# Patient Record
Sex: MALE | Race: BLACK OR AFRICAN AMERICAN | NOT HISPANIC OR LATINO | Employment: FULL TIME | URBAN - METROPOLITAN AREA
[De-identification: names, ages, dates, MRNs, and addresses within clinical notes are randomized per-mention and may not be internally consistent; named-entity substitution may affect disease eponyms.]

---
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
## 2019-01-29 ENCOUNTER — HOSPITAL ENCOUNTER (EMERGENCY)
Facility: HOSPITAL | Age: 19
Discharge: HOME | End: 2019-01-29
Attending: FAMILY MEDICINE

## 2019-01-29 VITALS
SYSTOLIC BLOOD PRESSURE: 124 MMHG | DIASTOLIC BLOOD PRESSURE: 56 MMHG | BODY MASS INDEX: 26.18 KG/M2 | HEIGHT: 71 IN | RESPIRATION RATE: 16 BRPM | TEMPERATURE: 99 F | OXYGEN SATURATION: 98 % | HEART RATE: 63 BPM | WEIGHT: 187 LBS

## 2019-01-29 DIAGNOSIS — S39.012A LUMBOSACRAL STRAIN, INITIAL ENCOUNTER: Primary | ICD-10-CM

## 2019-01-29 DIAGNOSIS — M54.50 ACUTE LEFT-SIDED LOW BACK PAIN WITHOUT SCIATICA: ICD-10-CM

## 2019-01-29 PROCEDURE — 99283 EMERGENCY DEPT VISIT LOW MDM: CPT | Mod: ER

## 2019-01-29 RX ORDER — IBUPROFEN 800 MG/1
800 TABLET ORAL 3 TIMES DAILY
Qty: 21 TABLET | Refills: 0 | Status: SHIPPED | OUTPATIENT
Start: 2019-01-29

## 2019-01-29 RX ORDER — METHOCARBAMOL 500 MG/1
500 TABLET, FILM COATED ORAL 2 TIMES DAILY PRN
Qty: 14 TABLET | Refills: 0 | Status: SHIPPED | OUTPATIENT
Start: 2019-01-29 | End: 2019-02-08

## 2019-01-29 NOTE — ED PROVIDER NOTES
This SmartLink is deprecated. Use AVIcecreamlabsEDLIST instead to display the medication list for a patient. eMERGENCY dEPARTMENT eNCOUnter    CHIEF COMPLAINT    Chief Complaint   Patient presents with    Back Pain     Pt c/o lower back pain x 2 days, states does heavy lifting at work.  Denies any other known injuries, denies blood in urine, denies urinary complications.        HPI    Deniz Sigala is a 18 y.o. male who presents to the ED low back pain for 2 days.  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  States in the past couple of days his back has began hurting.  He denies injury. States it is achy pain in his left lower back.  He denies numbness, tingling, radicular pain or incontinence.  Standing and bending makes the pain worse.  Nothing makes it better.    CURRENT MEDICATIONS    No current facility-administered medications on file prior to encounter.      Current Outpatient Medications on File Prior to Encounter   Medication Sig Dispense Refill    alprazolam (XANAX) 0.25 MG tablet Take 1 tablet (0.25 mg total) by mouth 3 (three) times daily as needed for Anxiety. 20 tablet 0    [DISCONTINUED] ibuprofen (ADVIL,MOTRIN) 600 MG tablet Take 1 tablet (600 mg total) by mouth every 6 (six) hours as needed for Pain. 20 tablet 0    [DISCONTINUED] ibuprofen (ADVIL,MOTRIN) 800 MG tablet Take 1 tablet (800 mg total) by mouth every 6 (six) hours as needed for Other (headache or back pain). 20 tablet 0         ALLERGIES    Review of patient's allergies indicates:  No Known Allergies    PAST MEDICAL HISTORY  Past Medical History:   Diagnosis Date    Anxiety        SURGICAL HISTORY    History reviewed. No pertinent surgical history.    SOCIAL HISTORY    Social History     Socioeconomic History    Marital status: Single     Spouse name: None    Number of children: None    Years of education: None    Highest education level: None   Social Needs    Financial resource strain:  "None    Food insecurity - worry: None    Food insecurity - inability: None    Transportation needs - medical: None    Transportation needs - non-medical: None   Occupational History    None   Tobacco Use    Smoking status: Current Some Day Smoker     Types: Cigars    Smokeless tobacco: Never Used   Substance and Sexual Activity    Alcohol use: No    Drug use: No    Sexual activity: Yes     Partners: Female     Birth control/protection: Condom   Other Topics Concern    None   Social History Narrative    None       FAMILY HISTORY    Family History   Problem Relation Age of Onset    Asthma Father        REVIEW OF SYSTEMS   ROS  Constitutional:  No fever, chills, weight loss or weakness.   Eyes:  No  Photophobia, blurred vision or discharge.   HENT:  No ear pain, nasal congestion or sore throat..  Respiratory:  No cough, shortness of breath or wheezing.   Cardiovascular:  No chest pain, palpitations or swelling.   GI:  No abdominal pain, nausea, vomiting, or diarrhea.  : No dysuria, frequency   Musculoskeletal:  Reports left lower back pain. Denies neck pain.   Skin:  No reported rashes or infected lesions.   Neurologic:  No reported headache, focal weakness or sensory changes.  Denies numbness, tingling or incontinence.    All Systems otherwise negative except as noted in the History of Present Illness.        PHYSICAL EXAM    Reviewed Triage Note  VITAL SIGNS: BP (!) 124/56 (BP Location: Left arm, Patient Position: Sitting)   Pulse 63   Temp 98.6 °F (37 °C) (Oral)   Resp 16   Ht 5' 11" (1.803 m)   Wt 84.8 kg (187 lb)   SpO2 98%   BMI 26.08 kg/m²    Vitals:    01/29/19 1219   BP: (!) 124/56   Pulse: 63   Resp: 16   Temp: 98.6 °F (37 °C)       Physical Exam  Nursing Notes and Vital Signs Reviewed  Constitutional:  Well-developed, well-nourished, afebrile nontoxic-appearing 18-year-old male in NAD.  HENT:  Normocephalic, atraumatic. Bilateral external EACs normal,  Nose normal, no rhinorrhea. Mouth " mucus membranes P & M.   Eyes:  PERRL EOMI. Conjunctiva normal without discharge.   Neck: Normal range of motion. No midline tenderness or vertebral step-off. No stridor. No meningismus. No lymphadenopathy.   Respiratory:  Normal breath sounds bilaterally.  No respiratory distress, retractions, or conversational dyspnea. No wheezing. No rhonchi. No rales.   Cardiovascular:  Normal heart rate. Normal rhythm. No pitting lower extremity edema.   Musculoskeletal:  Lumbosacral no gross deformity.  No palpable bony deformity or vertebral step-off.  Pain with rotation of trunk or bending at the waist.  Negative straight leg raise.  Tenderness noted left lumbar paraspinous muscle region.  Integument:  Warm and dry. No rash. No petechiae  Neurologic:   Alert and Interactive. MAEW. Gait steady.  No clonus or foot drop.  Psychiatric:  Affect normal. Mood normal.         LABS  Pertinent labs reviewed. (See chart for details)           RADIOLOGY    Imaging Results    None         PROCEDURES    Procedures      EKG         ED COURSE & MEDICAL DECISION MAKING    Pertinent & Imaging studies reviewed. (See chart for details and specific orders.)  18-year-old male with nontraumatic left low back pain. No numbness, tingling or incontinence.  No clonus or foot drop.  Prescription for ibuprofen and Robaxin.  Advise follow-up PCP.  Return if worsening or concerns.    Medications - No data to display        FINAL IMPRESSION    1. Lumbosacral strain, initial encounter    2. Acute left-sided low back pain without sciatica        Differential Diagnosis:  Cauda equina, lumbar compression fracture, lumbar subluxation.    Patient advised to follow-up with PCP for re-check                   Debora Franco NP  01/29/19 8455

## 2019-12-06 ENCOUNTER — HOSPITAL ENCOUNTER (EMERGENCY)
Facility: HOSPITAL | Age: 19
Discharge: HOME | End: 2019-12-06
Attending: EMERGENCY MEDICINE

## 2019-12-06 VITALS
DIASTOLIC BLOOD PRESSURE: 58 MMHG | RESPIRATION RATE: 18 BRPM | OXYGEN SATURATION: 98 % | WEIGHT: 198 LBS | BODY MASS INDEX: 27.72 KG/M2 | HEART RATE: 79 BPM | HEIGHT: 71 IN | SYSTOLIC BLOOD PRESSURE: 120 MMHG | TEMPERATURE: 99 F

## 2019-12-06 DIAGNOSIS — M25.539 WRIST PAIN: ICD-10-CM

## 2019-12-06 DIAGNOSIS — T30.0 SUPERFICIAL BURN: ICD-10-CM

## 2019-12-06 DIAGNOSIS — M79.642 LEFT HAND PAIN: Primary | ICD-10-CM

## 2019-12-06 PROCEDURE — 25000003 PHARM REV CODE 250: Performed by: NURSE PRACTITIONER

## 2019-12-06 PROCEDURE — 99283 EMERGENCY DEPT VISIT LOW MDM: CPT | Mod: 25

## 2019-12-06 RX ORDER — IBUPROFEN 600 MG/1
600 TABLET ORAL
Status: COMPLETED | OUTPATIENT
Start: 2019-12-06 | End: 2019-12-06

## 2019-12-06 RX ADMIN — IBUPROFEN 600 MG: 600 TABLET, FILM COATED ORAL at 12:12

## 2019-12-06 NOTE — DISCHARGE INSTRUCTIONS
Use RICE and take over-the-counter ibuprofen or Tylenol as labeled as needed for pain.  Follow-up with [facility] or PCP in 2-3 days return to ED for any concerns or worsening symptoms.

## 2019-12-06 NOTE — ED PROVIDER NOTES
"Encounter Date: 12/6/2019       History     Chief Complaint   Patient presents with    Hand Injury     Patient reports a beam at work fell down on his left hand on yesterday. Patient states when moving hand he feels pain and heard a crackiing nose. Jordan also reports a burn to left hand      19-year-old male presents ED for evaluation of left hand pain after injury yesterday at work.  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  Patient states that pain is worse with movement and he hears a "cracking noise" in his left wrist.  Patient also reports a burn to his left wrist from steam at work.  No treatments tried.  Patient is right-hand dominant.  Denies fever, numbness, tingling, weakness, or any other concerns.    The history is provided by the patient.     Review of patient's allergies indicates:  No Known Allergies  Past Medical History:   Diagnosis Date    Anxiety      History reviewed. No pertinent surgical history.  Family History   Problem Relation Age of Onset    Asthma Father      Social History     Tobacco Use    Smoking status: Current Some Day Smoker     Types: Cigars    Smokeless tobacco: Never Used   Substance Use Topics    Alcohol use: No    Drug use: No     Review of Systems   Constitutional: Negative for fever.   Musculoskeletal: Positive for arthralgias. Negative for joint swelling.   Neurological: Negative for weakness and numbness.   All other systems reviewed and are negative.      Physical Exam     Initial Vitals [12/06/19 1126]   BP Pulse Resp Temp SpO2   (!) 120/58 79 18 98.5 °F (36.9 °C) 98 %      MAP       --         Physical Exam    Vitals reviewed.  Constitutional: He appears well-developed and well-nourished.  Non-toxic appearance. He does not have a sickly appearance.   HENT:   Head: Atraumatic.   Mouth/Throat: Oropharynx is clear and moist.   Eyes: EOM are normal.   Neck: Normal range of motion, full passive range of " motion without pain and phonation normal. Neck supple.   Cardiovascular: Regular rhythm.   Pulses:       Radial pulses are 2+ on the right side, and 2+ on the left side.   Pulmonary/Chest: No respiratory distress.   Musculoskeletal:        Left hand: He exhibits tenderness and swelling. He exhibits normal range of motion, no bony tenderness, normal two-point discrimination, normal capillary refill, no deformity and no laceration. Normal sensation noted. Decreased sensation is not present in the radial distribution. Normal strength noted.   Sensation and strength intact in BUE.  No snuffbox tenderness or axial loading noted bilaterally.  Radial pulses equal bilaterally.  No crepitus or obvious deformity.   Neurological: He is alert and oriented to person, place, and time. He has normal strength. No sensory deficit.   Skin: Skin is warm. Burn noted.   Superficial burn noted to left wrist.  Signs of infection.   Psychiatric: He has a normal mood and affect.         ED Course   Procedures  Labs Reviewed - No data to display       Imaging Results          X-Ray Hand 3 View Left (Final result)  Result time 12/06/19 12:34:39    Final result by Dk Agee MD (12/06/19 12:34:39)                 Impression:      Chronic 4 mm metallic BB foreign body adjacent to the 3rd metacarpal head, without acute osseous process seen.      Electronically signed by: Dk Agee MD  Date:    12/06/2019  Time:    12:34             Narrative:    EXAMINATION:  XR HAND COMPLETE 3 VIEW LEFT; XR WRIST COMPLETE 3 VIEWS LEFT    CLINICAL HISTORY:  hand pain;. Pain in unspecified wrist    TECHNIQUE:  PA, lateral, and oblique views of the left hand and wrist were performed.    COMPARISON:  Left hand series 12/26/2015    FINDINGS:  There is a chronic 4 mm metallic BB foreign body imbedded within the soft tissues along the palmar aspect of the hand at the level of the 3rd metacarpal head near the MCP joint, unchanged.  No subcutaneous  "emphysema.    Bones are well mineralized.  Ulnar minus variance, unchanged.  Carpus is intact.  No displaced fracture, dislocation or destructive osseous process.  Joint spaces appear relatively maintained.                               X-Ray Wrist Complete Left (Final result)  Result time 12/06/19 12:34:39    Final result by Dk Agee MD (12/06/19 12:34:39)                 Impression:      Chronic 4 mm metallic BB foreign body adjacent to the 3rd metacarpal head, without acute osseous process seen.      Electronically signed by: Dk Agee MD  Date:    12/06/2019  Time:    12:34             Narrative:    EXAMINATION:  XR HAND COMPLETE 3 VIEW LEFT; XR WRIST COMPLETE 3 VIEWS LEFT    CLINICAL HISTORY:  hand pain;. Pain in unspecified wrist    TECHNIQUE:  PA, lateral, and oblique views of the left hand and wrist were performed.    COMPARISON:  Left hand series 12/26/2015    FINDINGS:  There is a chronic 4 mm metallic BB foreign body imbedded within the soft tissues along the palmar aspect of the hand at the level of the 3rd metacarpal head near the MCP joint, unchanged.  No subcutaneous emphysema.    Bones are well mineralized.  Ulnar minus variance, unchanged.  Carpus is intact.  No displaced fracture, dislocation or destructive osseous process.  Joint spaces appear relatively maintained.                                 Medical Decision Making:   History:   Old Medical Records: I decided to obtain old medical records.  Initial Assessment:   19-year-old male presents ED for evaluation of left hand pain after injury yesterday at work.  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  Patient states that pain is worse with movement and he hears a "cracking noise" in his left wrist.  Patient also reports a burn to his left wrist from steam at work.  No treatments tried.  Patient is right-hand dominant.  Denies fever, numbness, tingling, weakness, or any other " concerns.      Clinical Tests:   Radiological Study: Reviewed and Ordered  ED Management:  Xray shows chronic 4 mm metallic BB foreign body adjacent to the 3rd metacarpal head, without acute osseous process seen.  No signs of septic joint or cellulitis.  Patient's signs symptoms most likely due to MSK sprain/strain and superficial burn.  No signs of infection.  Patient is hemodynamically stable be discharged home. RICE principles reviewed.  Patient should follow up with PCP or [facility] in 2 3 days and return to ED for any concerns or worsening symptoms.  Patient verbalized understanding, compliance, and agreement treatment.                                 Clinical Impression:       ICD-10-CM ICD-9-CM   1. Left hand pain M79.642 729.5   2. Wrist pain M25.539 719.43   3. Superficial burn T30.0 949.1                             Armando Pulido NP  12/06/19 1300

## 2023-03-16 ENCOUNTER — HOSPITAL ENCOUNTER (EMERGENCY)
Facility: HOSPITAL | Age: 23
Discharge: HOME | End: 2023-03-16
Attending: EMERGENCY MEDICINE

## 2023-03-16 VITALS
BODY MASS INDEX: 28 KG/M2 | RESPIRATION RATE: 18 BRPM | WEIGHT: 200 LBS | HEIGHT: 71 IN | DIASTOLIC BLOOD PRESSURE: 69 MMHG | SYSTOLIC BLOOD PRESSURE: 135 MMHG | TEMPERATURE: 98 F | HEART RATE: 59 BPM | OXYGEN SATURATION: 100 %

## 2023-03-16 DIAGNOSIS — R05.9 COUGH: ICD-10-CM

## 2023-03-16 DIAGNOSIS — F41.0 ANXIETY ATTACK: Primary | ICD-10-CM

## 2023-03-16 DIAGNOSIS — R06.02 SOB (SHORTNESS OF BREATH): ICD-10-CM

## 2023-03-16 LAB
ALBUMIN SERPL BCP-MCNC: 5 G/DL (ref 3.5–5.2)
ALP SERPL-CCNC: 75 U/L (ref 55–135)
ALT SERPL W/O P-5'-P-CCNC: 23 U/L (ref 10–44)
ANION GAP SERPL CALC-SCNC: 10 MMOL/L (ref 8–16)
AST SERPL-CCNC: 29 U/L (ref 10–40)
BASOPHILS # BLD AUTO: 0.04 K/UL (ref 0–0.2)
BASOPHILS NFR BLD: 0.9 % (ref 0–1.9)
BILIRUB SERPL-MCNC: 0.6 MG/DL (ref 0.1–1)
BILIRUB UR QL STRIP: NEGATIVE
BUN SERPL-MCNC: 9 MG/DL (ref 6–20)
CALCIUM SERPL-MCNC: 9.8 MG/DL (ref 8.7–10.5)
CHLORIDE SERPL-SCNC: 103 MMOL/L (ref 95–110)
CLARITY UR: CLEAR
CO2 SERPL-SCNC: 25 MMOL/L (ref 23–29)
COLOR UR: YELLOW
CREAT SERPL-MCNC: 1 MG/DL (ref 0.5–1.4)
DIFFERENTIAL METHOD: NORMAL
EOSINOPHIL # BLD AUTO: 0.2 K/UL (ref 0–0.5)
EOSINOPHIL NFR BLD: 3.4 % (ref 0–8)
ERYTHROCYTE [DISTWIDTH] IN BLOOD BY AUTOMATED COUNT: 13.2 % (ref 11.5–14.5)
EST. GFR  (NO RACE VARIABLE): >60 ML/MIN/1.73 M^2
GLUCOSE SERPL-MCNC: 92 MG/DL (ref 70–110)
GLUCOSE UR QL STRIP: NEGATIVE
HCT VFR BLD AUTO: 48.3 % (ref 40–54)
HGB BLD-MCNC: 16.3 G/DL (ref 14–18)
HGB UR QL STRIP: NEGATIVE
IMM GRANULOCYTES # BLD AUTO: 0.01 K/UL (ref 0–0.04)
IMM GRANULOCYTES NFR BLD AUTO: 0.2 % (ref 0–0.5)
KETONES UR QL STRIP: NEGATIVE
LEUKOCYTE ESTERASE UR QL STRIP: NEGATIVE
LYMPHOCYTES # BLD AUTO: 1.6 K/UL (ref 1–4.8)
LYMPHOCYTES NFR BLD: 35.8 % (ref 18–48)
MAGNESIUM SERPL-MCNC: 1.7 MG/DL (ref 1.6–2.6)
MCH RBC QN AUTO: 27.6 PG (ref 27–31)
MCHC RBC AUTO-ENTMCNC: 33.7 G/DL (ref 32–36)
MCV RBC AUTO: 82 FL (ref 82–98)
MONOCYTES # BLD AUTO: 0.4 K/UL (ref 0.3–1)
MONOCYTES NFR BLD: 8.3 % (ref 4–15)
NEUTROPHILS # BLD AUTO: 2.3 K/UL (ref 1.8–7.7)
NEUTROPHILS NFR BLD: 51.4 % (ref 38–73)
NITRITE UR QL STRIP: NEGATIVE
NRBC BLD-RTO: 0 /100 WBC
PH UR STRIP: 8 [PH] (ref 5–8)
PLATELET # BLD AUTO: 233 K/UL (ref 150–450)
PLATELET BLD QL SMEAR: NORMAL
PMV BLD AUTO: 10.3 FL (ref 9.2–12.9)
POTASSIUM SERPL-SCNC: 3.4 MMOL/L (ref 3.5–5.1)
PROT SERPL-MCNC: 8.5 G/DL (ref 6–8.4)
PROT UR QL STRIP: NEGATIVE
RBC # BLD AUTO: 5.91 M/UL (ref 4.6–6.2)
SODIUM SERPL-SCNC: 138 MMOL/L (ref 136–145)
SP GR UR STRIP: 1.01 (ref 1–1.03)
TROPONIN I SERPL HS-MCNC: <2.3 PG/ML (ref 0–14.9)
URN SPEC COLLECT METH UR: NORMAL
UROBILINOGEN UR STRIP-ACNC: NEGATIVE EU/DL
WBC # BLD AUTO: 4.44 K/UL (ref 3.9–12.7)

## 2023-03-16 PROCEDURE — 99284 EMERGENCY DEPT VISIT MOD MDM: CPT | Mod: 25

## 2023-03-16 PROCEDURE — 84484 ASSAY OF TROPONIN QUANT: CPT | Performed by: NURSE PRACTITIONER

## 2023-03-16 PROCEDURE — 83735 ASSAY OF MAGNESIUM: CPT | Performed by: NURSE PRACTITIONER

## 2023-03-16 PROCEDURE — 80053 COMPREHEN METABOLIC PANEL: CPT | Performed by: NURSE PRACTITIONER

## 2023-03-16 PROCEDURE — 93010 ELECTROCARDIOGRAM REPORT: CPT | Mod: ,,, | Performed by: SPECIALIST

## 2023-03-16 PROCEDURE — 25000003 PHARM REV CODE 250: Performed by: NURSE PRACTITIONER

## 2023-03-16 PROCEDURE — 93005 ELECTROCARDIOGRAM TRACING: CPT | Performed by: SPECIALIST

## 2023-03-16 PROCEDURE — 85025 COMPLETE CBC W/AUTO DIFF WBC: CPT | Performed by: NURSE PRACTITIONER

## 2023-03-16 PROCEDURE — 81003 URINALYSIS AUTO W/O SCOPE: CPT | Performed by: NURSE PRACTITIONER

## 2023-03-16 PROCEDURE — 93010 EKG 12-LEAD: ICD-10-PCS | Mod: ,,, | Performed by: SPECIALIST

## 2023-03-16 RX ORDER — HYDROXYZINE HYDROCHLORIDE 25 MG/1
25 TABLET, FILM COATED ORAL ONCE
Status: COMPLETED | OUTPATIENT
Start: 2023-03-16 | End: 2023-03-16

## 2023-03-16 RX ORDER — HYDROXYZINE HYDROCHLORIDE 25 MG/1
25 TABLET, FILM COATED ORAL EVERY 6 HOURS
Qty: 12 TABLET | Refills: 0 | Status: SHIPPED | OUTPATIENT
Start: 2023-03-16

## 2023-03-16 RX ORDER — POTASSIUM CHLORIDE 20 MEQ/1
40 TABLET, EXTENDED RELEASE ORAL
Status: COMPLETED | OUTPATIENT
Start: 2023-03-16 | End: 2023-03-16

## 2023-03-16 RX ORDER — HYDROXYZINE HYDROCHLORIDE 25 MG/1
25 TABLET, FILM COATED ORAL 3 TIMES DAILY PRN
Status: DISCONTINUED | OUTPATIENT
Start: 2023-03-16 | End: 2023-03-16

## 2023-03-16 RX ADMIN — HYDROXYZINE HYDROCHLORIDE 25 MG: 25 TABLET ORAL at 03:03

## 2023-03-16 RX ADMIN — SODIUM CHLORIDE 1000 ML: 0.9 INJECTION, SOLUTION INTRAVENOUS at 01:03

## 2023-03-16 RX ADMIN — POTASSIUM CHLORIDE 40 MEQ: 1500 TABLET, EXTENDED RELEASE ORAL at 02:03

## 2023-03-16 NOTE — DISCHARGE INSTRUCTIONS
Make a follow-up appointment at [facility] [address] phone number is [phone].  Return to the ED for any worsening of symptoms or any other concerns.

## 2023-03-16 NOTE — ED PROVIDER NOTES
Encounter Date: 3/16/2023       History     Chief Complaint   Patient presents with    Panic Attack     WHILE AT WORK TODAY    Shortness of Breath     Presents with report of having a panic attack at work.  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  Patient began to be short of breath his heart was racing in his chest became tight.  He reports he is had panic attacks since he was 17 years old mother and father both have history of panic attacks.    Review of patient's allergies indicates:  No Known Allergies  Past Medical History:   Diagnosis Date    Anxiety      No past surgical history on file.  Family History   Problem Relation Age of Onset    Asthma Father      Social History     Tobacco Use    Smoking status: Some Days     Types: Cigars    Smokeless tobacco: Never   Substance Use Topics    Alcohol use: No    Drug use: No     Review of Systems   Constitutional:  Negative for fever.   Respiratory:  Positive for chest tightness and shortness of breath. Negative for cough and wheezing.    Cardiovascular:  Positive for palpitations. Negative for chest pain and leg swelling.   Gastrointestinal:  Negative for abdominal pain, diarrhea, nausea and vomiting.   Musculoskeletal:  Negative for back pain.   Skin:  Negative for rash.   Neurological:  Negative for dizziness, weakness, light-headedness and headaches.     Physical Exam     Initial Vitals [03/16/23 1243]   BP Pulse Resp Temp SpO2   (!) 157/82 (!) 59 20 97.7 °F (36.5 °C) 100 %      MAP       --         Physical Exam    Constitutional: He appears well-developed and well-nourished.   HENT:   Head: Normocephalic.   Mouth/Throat: Oropharynx is clear and moist.   Eyes: Conjunctivae are normal.   Neck: Neck supple.   Normal range of motion.  Cardiovascular:  Normal rate and regular rhythm.           Pulmonary/Chest: Breath sounds normal. No respiratory distress. He has no wheezes. He has no rhonchi. He exhibits no tenderness.   Abdominal:  Abdomen is soft. Bowel sounds are normal. He exhibits no distension. There is no abdominal tenderness.   Musculoskeletal:         General: Normal range of motion.      Cervical back: Normal range of motion and neck supple.      Comments: Patient is ambulatory per self.  Gait is steady.     Neurological: He is alert and oriented to person, place, and time. No sensory deficit. GCS score is 15. GCS eye subscore is 4. GCS verbal subscore is 5. GCS motor subscore is 6.   Patient is alert and oriented.  He is calm at present.  He is cooperative.   Skin: Skin is warm. Capillary refill takes less than 2 seconds.   Psychiatric: He has a normal mood and affect.       ED Course   Procedures  Labs Reviewed   COMPREHENSIVE METABOLIC PANEL - Abnormal; Notable for the following components:       Result Value    Potassium 3.4 (*)     Total Protein 8.5 (*)     All other components within normal limits   CBC W/ AUTO DIFFERENTIAL   MAGNESIUM   TROPONIN I HIGH SENSITIVITY   URINALYSIS, REFLEX TO URINE CULTURE    Narrative:     Specimen Source->Urine     EKG Readings: (Independently Interpreted)   Initial Reading: No STEMI. Rhythm: Sinus Bradycardia. Heart Rate: 57.   ECG Results              EKG 12-lead (In process)  Result time 03/16/23 13:00:55      In process by Interface, Lab In [facility] (03/16/23 13:00:55)                   Narrative:    Test Reason : R06.02,    Vent. Rate : 057 BPM     Atrial Rate : 057 BPM     P-R Int : 152 ms          QRS Dur : 096 ms      QT Int : 412 ms       P-R-T Axes : 042 064 045 degrees     QTc Int : 401 ms    Sinus bradycardia  Right ventricular conduction delay  Possible Acute pericarditis  Abnormal ECG  When compared with ECG of 16-MAR-2023 12:53,  No significant change was found    Referred By:             Confirmed By:                                   Imaging Results              X-Ray Chest AP Portable (Final result)  Result time 03/16/23 13:42:12      Final result by Librado Xiong MD (03/16/23  "13:42:12)                   Narrative:    HISTORY: Acute dyspnea.    FINDINGS: Portable chest radiograph at 1325 hours with no prior studies for comparison shows the cardiomediastinal silhouette and pulmonary vasculature are within normal limits.    The lungs are normally expanded, with no consolidation, large pleural effusion, or evidence of pulmonary edema. No confluent infiltrates or pneumothorax. There are no significant osseous abnormalities.    IMPRESSION: No evidence of active cardiopulmonary disease.    Electronically signed by:  Librado Xiong MD  3/16/2023 1:42 PM CDT Workstation: [id number]                                     Medications   sodium chloride 0.9% bolus 1,000 mL 1,000 mL (0 mLs Intravenous Stopped 3/16/23 1446)   potassium chloride SA CR tablet 40 mEq (40 mEq Oral Given 3/16/23 1443)   hydrOXYzine HCL tablet 25 mg (25 mg Oral Given 3/16/23 1507)     Medical Decision Making:   Initial Assessment:   Presents with report of having a "panic attack" at work.  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  His heart rate here is 59.  His vital signs are within normal limits.  Clinical Tests:   Lab Tests: Reviewed  The following lab test(s) were unremarkable: CBC, CMP and Troponin  Radiological Study: Reviewed  ED Management:  Patient's labs all within warm limit.  His chest x-rays negative.  Patient is calm and cooperative.  He has been given Atarax 25 mg p.o. for his anxiety.  His family is at bedside.  Sister and brother at bedside they have given me the history that both his mother and his father have had panic attacks.  His father passed away several years ago.  Patient's grandmother passed away prior to that.  His family seems to think that this is some of the things that are now triggering his anxiety again.  They confirmed that he has had anxiety since he was about 17 years old.  I have given this patient a " mental health center to follow-up with regarding therapy.  He was sent home with a prescription for Atarax.  At discharge he and his family and I were laughing and talking with the patient.  He was at ease.  At no time while in the ED did he appear to be in any acute distress.  Have discussed this patient with                         Clinical Impression:   Final diagnoses:  [R06.02] SOB (shortness of breath)  [R05.9] Cough  [F41.0] Anxiety attack (Primary)        ED Disposition Condition    Discharge Stable          ED Prescriptions       Medication Sig Dispense Start Date End Date Auth. Provider    hydrOXYzine HCL (ATARAX) 25 MG tablet Take 1 tablet (25 mg total) by mouth every 6 (six) hours. 12 tablet 3/16/2023 -- Catia Turcios NP          Follow-up Information       Follow up With Specialties Details Why Contact Info    [facility]  In 3 days  [address] [address]  [phone]               Catia Turcios NP  03/16/23 1632       Catia Turcios NP  03/16/23 3755

## 2023-03-16 NOTE — ED NOTES
Pt in rm talking and laughing with friends at bedside. No distress noted. Will continue to monitor

## 2023-05-23 ENCOUNTER — HOSPITAL ENCOUNTER (EMERGENCY)
Facility: HOSPITAL | Age: 23
Discharge: HOME | End: 2023-05-23
Attending: EMERGENCY MEDICINE

## 2023-05-23 VITALS
TEMPERATURE: 99 F | RESPIRATION RATE: 18 BRPM | WEIGHT: 200 LBS | BODY MASS INDEX: 27.89 KG/M2 | SYSTOLIC BLOOD PRESSURE: 137 MMHG | OXYGEN SATURATION: 99 % | HEART RATE: 78 BPM | DIASTOLIC BLOOD PRESSURE: 78 MMHG

## 2023-05-23 DIAGNOSIS — S61.211A LACERATION OF LEFT INDEX FINGER WITHOUT FOREIGN BODY WITHOUT DAMAGE TO NAIL, INITIAL ENCOUNTER: Primary | ICD-10-CM

## 2023-05-23 PROCEDURE — 63600175 PHARM REV CODE 636 W HCPCS

## 2023-05-23 PROCEDURE — 90471 IMMUNIZATION ADMIN: CPT

## 2023-05-23 PROCEDURE — 90715 TDAP VACCINE 7 YRS/> IM: CPT

## 2023-05-23 PROCEDURE — 99284 EMERGENCY DEPT VISIT MOD MDM: CPT

## 2023-05-23 PROCEDURE — 12001 RPR S/N/AX/GEN/TRNK 2.5CM/<: CPT

## 2023-05-23 RX ORDER — LIDOCAINE HYDROCHLORIDE 10 MG/ML
INJECTION, SOLUTION EPIDURAL; INFILTRATION; INTRACAUDAL; PERINEURAL
Status: DISCONTINUED
Start: 2023-05-23 | End: 2023-05-23 | Stop reason: HOSPADM

## 2023-05-23 RX ORDER — LIDOCAINE HYDROCHLORIDE 10 MG/ML
10 INJECTION, SOLUTION EPIDURAL; INFILTRATION; INTRACAUDAL; PERINEURAL
Status: DISCONTINUED | OUTPATIENT
Start: 2023-05-23 | End: 2023-05-23 | Stop reason: HOSPADM

## 2023-05-23 RX ADMIN — TETANUS TOXOID, REDUCED DIPHTHERIA TOXOID AND ACELLULAR PERTUSSIS VACCINE, ADSORBED 0.5 ML: 5; 2.5; 8; 8; 2.5 SUSPENSION INTRAMUSCULAR at 08:05

## 2023-05-23 NOTE — Clinical Note
"Deniz "Deniz" Jovon was seen and treated in our emergency department on 5/23/2023.  He may return to work on 05/25/2023.       If you have any questions or concerns, please don't hesitate to call.      Denise Conner PA-C"

## 2023-05-24 NOTE — DISCHARGE INSTRUCTIONS
Keep sutures/staples and wound clean and dry.  Avoid submersion underwater; use soap, clean water, and gentle scrubbing to clean area.  Apply a new sterile bandage when existing bandage becomes dirty or saturated.  Monitor the wound regularly for any evidence of infection, including redness, drainage, increasing pain, or fever.   Follow-up with your PCP or return to ER as directed for wound re-check and suture/staple removal in 7 days.    Our goal in the ER is to always give you outstanding care and exceptional service. You may receive a survey by mail or email in the next week about your experience in our ED. We would greatly appreciate you completing and returning the survey. Your feedback provides us with a way to recognize our staff who give very good care and it helps us learn how to improve when your experience was below our aspiration of excellence.     Sincerely,     Denise Conner PA-C  Emergency Room Physician Assistant  [facility]

## 2023-05-24 NOTE — ED PROVIDER NOTES
Encounter Date: 5/23/2023       History     Chief Complaint   Patient presents with    Hand Pain     Pt is complaining of right index finger pain.  Has a laceration from a post. Pt is utd on tetanus.  Pt denies any allergies.     23-year-old male presents the ED with pain to his right index finger.  Patient was helping install a mailbox and his finger was struck with a sledgehammer.  Decreased range of motion to PIP and DIP joint due to pain.  Patient has 1.5 cm laceration to dorsal surface of index finger.  No numbness or tingling.  Patient believes tetanus is up-to-date.      Review of patient's allergies indicates:  No Known Allergies  Past Medical History:   Diagnosis Date    Anxiety      No past surgical history on file.  Family History   Problem Relation Age of Onset    Asthma Father      Social History     Tobacco Use    Smoking status: Some Days     Types: Cigars    Smokeless tobacco: Never   Substance Use Topics    Alcohol use: No    Drug use: No     Review of Systems   Constitutional:  Negative for chills and fever.   Eyes:  Negative for redness.   Gastrointestinal:  Negative for nausea and vomiting.   Musculoskeletal:  Positive for joint swelling (right index finger).   Skin:  Positive for wound (1.5 cm curved laceration to right index finger, dorsal surface).   Psychiatric/Behavioral:  Negative for agitation and confusion.      Physical Exam     Initial Vitals [05/23/23 1720]   BP Pulse Resp Temp SpO2   (!) 142/80 80 18 98.6 °F (37 °C) 99 %      MAP       --         Physical Exam    Nursing note and vitals reviewed.  Constitutional: He appears well-developed and well-nourished. He is not diaphoretic.  Non-toxic appearance. No distress.   HENT:   Head: Normocephalic and atraumatic.   Right Ear: External ear normal.   Left Ear: External ear normal.   Eyes: EOM are normal.   Neck: Neck supple.   Normal range of motion.  Cardiovascular:  Normal rate.           Pulmonary/Chest: No respiratory distress.    Abdominal: He exhibits no distension.   Musculoskeletal:         General: Normal range of motion.      Cervical back: Normal range of motion and neck supple.     Neurological: He is alert and oriented to person, place, and time. GCS score is 15. GCS eye subscore is 4. GCS verbal subscore is 5. GCS motor subscore is 6.   Skin: Skin is dry.   1.5 cm superficial curved laceration to dorsal aspect of left index finger.  No visible tendon, nerve, vascular injury.  Slight decrease in DIP/PIP range of motion due to pain.  Brisk capillary refill.  2+ radial pulses.  Sensation intact.   Psychiatric: He has a normal mood and affect. His behavior is normal. Judgment and thought content normal.       ED Course   Lac Repair    Date/Time: 5/23/2023 8:56 PM  Performed by: Denise Conner PA-C  Authorized by: El Ibrahim MD     Consent:     Consent obtained:  Verbal  Universal protocol:     Patient identity confirmed:  Verbally with patient  Anesthesia:     Anesthesia method:  Nerve block    Block needle gauge:  25 G    Block anesthetic:  Lidocaine 1% w/o epi    Block injection procedure:  Anatomic landmarks identified, introduced needle and negative aspiration for blood    Block outcome:  Anesthesia achieved  Laceration details:     Location:  Finger    Finger location:  L index finger    Length (cm):  1.5    Depth (mm):  0.4  Exploration:     Imaging outcome: foreign body not noted      Wound exploration: wound explored through full range of motion and entire depth of wound visualized    Treatment:     Area cleansed with:  Povidone-iodine    Amount of cleaning:  Standard    Irrigation method:  Syringe    Visualized foreign bodies/material removed: no    Skin repair:     Repair method:  Sutures    Suture size:  4-0    Suture material:  Nylon    Suture technique:  Simple interrupted    Number of sutures:  3  Repair type:     Repair type:  Simple  Post-procedure details:     Dressing:  Open (no dressing)    Procedure  completion:  Tolerated well, no immediate complications  Labs Reviewed - No data to display       Imaging Results              X-Ray Finger 2 or More Views Right (Final result)  Result time 05/23/23 19:57:30      Final result by Alana Baxter MD (05/23/23 19:57:30)                   Impression:      No significant acute abnormality involving the right index finger.      Electronically signed by: Alana Baxter  Date:    05/23/2023  Time:    19:57               Narrative:    EXAMINATION:  XR FINGER 2 OR MORE VIEWS RIGHT    CLINICAL HISTORY:  right index finger injury;    TECHNIQUE:  Three views of the right index finger were obtained    COMPARISON:  None    FINDINGS:  There is no acute fracture or dislocation of the right index finger.  Soft tissues grossly appear intact.  Joint spaces are well maintained.                                       Medications   Tdap (BOOSTRIX) vaccine injection 0.5 mL (0.5 mLs Intramuscular Given 5/23/23 2008)     Medical Decision Making:   Differential Diagnosis:   Laceration, abrasion, nerve injury, vascular injury, tendon injury, fracture, open fracture    ED Management:  X-ray of left index finger without evidence of fracture. Tetanus updated. Patient tolerated laceration repair well.  Three sutures placed.  Keep wound clean and dry.  Patient instructed to return in 7 days for suture removal.  Monitor for signs of infection.  ED return precautions discussed.           ED Course as of 05/23/23 2313   Tue May 23, 2023   2008 X-Ray Finger 2 or More Views Right  No significant acute abnormality involving the right index finger. [CS]      ED Course User Index  [CS] Denise Conner PA-C                 Clinical Impression:   Final diagnoses:  [S61.211A] Laceration of left index finger without foreign body without damage to nail, initial encounter (Primary)        ED Disposition Condition    Discharge Stable          ED Prescriptions    None       Follow-up Information        Follow up With Specialties Details Why Contact Info    Primary Care Provider  Schedule an appointment as soon as possible for a visit in 1 week For suture removal in one week              Denise Conner PA-C  05/23/23 3876

## 2023-05-24 NOTE — ED NOTES
Patient given DC instructions, hand care completed with gauze and coban , patient educated on rotation of ibuprofen  and tylenol , patient verbalizes and understands all DC instructions . VSS and WNL